# Patient Record
Sex: FEMALE | Race: WHITE | NOT HISPANIC OR LATINO | Employment: FULL TIME
[De-identification: names, ages, dates, MRNs, and addresses within clinical notes are randomized per-mention and may not be internally consistent; named-entity substitution may affect disease eponyms.]

---

## 2020-08-05 ENCOUNTER — TRANSCRIBE ORDERS (OUTPATIENT)
Dept: OTHER | Age: 35
End: 2020-08-05

## 2020-08-05 DIAGNOSIS — N64.59 ABNORMAL BREAST FINDING: Primary | ICD-10-CM

## 2020-08-10 ENCOUNTER — PATIENT OUTREACH (OUTPATIENT)
Dept: SURGERY | Facility: CLINIC | Age: 35
End: 2020-08-10

## 2020-08-10 NOTE — TELEPHONE ENCOUNTER
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      Referral updates and Plan:       Consult with Surgical Oncology     08/10/2020 11:48 AM - Called and left a message for patient regarding referral and requested a call back. Per review of chart looks like pt is scheduled for follow up and possible surgery at [facility]. Will discuss if patient wants consult at the [facility] and if so will arrange for consult with Dr. Maria Elena MARISCAL. Left my direct line for patient to call back.     08/11/2020 4:42 PM - Referral closed patient going to [facility].     Luz Decker, RN, BSN   Surgical Oncology New Patient Nurse Navigator  [facility]  [phone]

## 2023-04-19 ENCOUNTER — OFFICE VISIT (OUTPATIENT)
Dept: URGENT CARE | Facility: URGENT CARE | Age: 38
End: 2023-04-19
Payer: COMMERCIAL

## 2023-04-19 VITALS
HEART RATE: 77 BPM | TEMPERATURE: 98.8 F | SYSTOLIC BLOOD PRESSURE: 104 MMHG | RESPIRATION RATE: 16 BRPM | DIASTOLIC BLOOD PRESSURE: 69 MMHG | OXYGEN SATURATION: 100 %

## 2023-04-19 DIAGNOSIS — J98.01 ACUTE BRONCHOSPASM: Primary | ICD-10-CM

## 2023-04-19 PROCEDURE — 99203 OFFICE O/P NEW LOW 30 MIN: CPT | Performed by: FAMILY MEDICINE

## 2023-04-19 RX ORDER — CETIRIZINE HYDROCHLORIDE 10 MG/1
10 TABLET ORAL DAILY
COMMUNITY

## 2023-04-19 RX ORDER — PREDNISONE 20 MG/1
TABLET ORAL
Qty: 10 TABLET | Refills: 0 | Status: SHIPPED | OUTPATIENT
Start: 2023-04-19

## 2023-04-19 RX ORDER — POLYETHYLENE GLYCOL 3350 17 G/17G
17 POWDER, FOR SOLUTION ORAL DAILY
COMMUNITY

## 2023-04-19 RX ORDER — ALBUTEROL SULFATE 90 UG/1
2 AEROSOL, METERED RESPIRATORY (INHALATION) EVERY 6 HOURS PRN
Qty: 18 G | Refills: 0 | Status: SHIPPED | OUTPATIENT
Start: 2023-04-19

## 2023-04-19 NOTE — PROGRESS NOTES
"  Assessment & Plan     Acute bronchospasm    - albuterol (PROAIR HFA/PROVENTIL HFA/VENTOLIN HFA) 108 (90 Base) MCG/ACT inhaler; Inhale 2 puffs into the lungs every 6 hours as needed for shortness of breath, wheezing or cough  - predniSONE (DELTASONE) 20 MG tablet; 2 tabs once daily x 5 days    Recommend trialing albuterol inhaler first to see if this is enough to reduce bronchospasm in next 2-3 days.  If still not enough significant improvement- recommend starting prednisone burst.  Close Follow-up if no change or new or worsening sx prn.    Rhonda Jimenez MD  [facility] URG[facility] [place] is a 38 year old, presenting for the following health issues:  Urgent Care and Cough (Dry cough for 6 weeks-Getting worse in last week-worse in evenings \"coughing fits\"-Patient is 10 weeks pregnant)         View : No data to display.              HPI     Dry cough x 6 weeks- worsening in last week in PMs.  10 weeks GA. -- 2 year old son in .  He has hx of asthma after getting RSV at 4 weeks of age.  Patient has no hx of asthma.  Otherwise feels well.  Hx of seasonal allergies- using Zyrtec.  No fever or chills.  No SOB.    Review of Systems   Constitutional, HEENT, cardiovascular, pulmonary, GI, , musculoskeletal, neuro, skin, endocrine and psych systems are negative, except as otherwise noted.      Objective    /69   Pulse 77   Temp 98.8  F (37.1  C) (Tympanic)   Resp 16   SpO2 100%   Breastfeeding No   There is no height or weight on file to calculate BMI.  Physical Exam   GENERAL: healthy, alert and no distress  EYES: Eyes grossly normal to inspection, PERRL and conjunctivae and sclerae normal  NECK: no adenopathy, no asymmetry, masses, or scars and thyroid normal to palpation  RESP: lungs clear to auscultation - no rales, rhonchi or wheezes, dry prolonged expiration with cough.  No wheezes noted.  CV: regular rate and rhythm, normal S1 S2, no S3 or S4, no murmur, " click or rub, no peripheral edema and peripheral pulses strong  MS: no gross musculoskeletal defects noted, no edema  PSYCH: mentation appears normal, affect normal/bright

## 2023-12-17 ENCOUNTER — HEALTH MAINTENANCE LETTER (OUTPATIENT)
Age: 38
End: 2023-12-17

## 2024-09-13 ENCOUNTER — OFFICE VISIT (OUTPATIENT)
Dept: PODIATRY | Facility: CLINIC | Age: 39
End: 2024-09-13
Payer: COMMERCIAL

## 2024-09-13 VITALS
HEART RATE: 72 BPM | BODY MASS INDEX: 36.16 KG/M2 | SYSTOLIC BLOOD PRESSURE: 108 MMHG | HEIGHT: 66 IN | WEIGHT: 225 LBS | DIASTOLIC BLOOD PRESSURE: 72 MMHG

## 2024-09-13 DIAGNOSIS — M72.2 PLANTAR FASCIITIS, RIGHT: Primary | ICD-10-CM

## 2024-09-13 DIAGNOSIS — M76.821 POSTERIOR TIBIAL TENDON DYSFUNCTION (PTTD) OF BOTH LOWER EXTREMITIES: ICD-10-CM

## 2024-09-13 DIAGNOSIS — M76.822 POSTERIOR TIBIAL TENDON DYSFUNCTION (PTTD) OF BOTH LOWER EXTREMITIES: ICD-10-CM

## 2024-09-13 PROCEDURE — 99203 OFFICE O/P NEW LOW 30 MIN: CPT | Performed by: PODIATRIST

## 2024-09-13 ASSESSMENT — PAIN SCALES - GENERAL: PAINLEVEL: NO PAIN (1)

## 2024-09-13 NOTE — PATIENT INSTRUCTIONS

## 2024-09-13 NOTE — NURSING NOTE
"Chief Complaint   Patient presents with    Consult     Right foot pain       Initial /72   Pulse 72   Ht 1.676 m (5' 6\")   Wt 102.1 kg (225 lb)   Breastfeeding Yes   BMI 36.32 kg/m   Estimated body mass index is 36.32 kg/m  as calculated from the following:    Height as of this encounter: 1.676 m (5' 6\").    Weight as of this encounter: 102.1 kg (225 lb).  Medications and allergies reviewed.      Fatimah ROSE MA    "

## 2024-09-13 NOTE — LETTER
"9/13/2024      Cecilia Clark  [id number] [place] [address]      Dear Colleague,    Thank you for referring your patient, Cecilia Clark, to the [facility]. Please see a copy of my visit note below.    PATIENT HISTORY:  Cecilia Clark is a 39 year old female who presents to clinic with a chief complaint of right foot pain.  The patient is seen by themselves.  The patient relates the pain is primarily located around the outer edge into the arch.  Reports insidious onset without acute precipitating event.  The patient relates that the symptoms have been going on for several month(s).  The patient has previously tried different shoes with little relief.  The patient is currently employed as  .  Any previous notes and studies that pertain to the patient's condition were reviewed.    Pertinent medical, surgical and family history was reviewed in the [facility] chart.    Past Medical History: History reviewed. No pertinent past medical history.    Medications:   Current Outpatient Medications:      albuterol (PROAIR HFA/PROVENTIL HFA/VENTOLIN HFA) 108 (90 Base) MCG/ACT inhaler, Inhale 2 puffs into the lungs every 6 hours as needed for shortness of breath, wheezing or cough, Disp: 18 g, Rfl: 0     cetirizine (ZYRTEC) 10 MG tablet, Take 10 mg by mouth daily, Disp: , Rfl:      doxylamine (UNISOM) 25 MG TABS tablet, Take 25 mg by mouth At Bedtime, Disp: , Rfl:      polyethylene glycol (MIRALAX) 17 GM/Dose powder, Take 17 g by mouth daily, Disp: , Rfl:      predniSONE (DELTASONE) 20 MG tablet, 2 tabs once daily x 5 days, Disp: 10 tablet, Rfl: 0     Prenatal MV-Min-Fe Fum-FA-DHA (PRENATAL+DHA PO), , Disp: , Rfl:      Allergies:    Allergies   Allergen Reactions     Codeine      Other reaction(s): GI intolerance, GI Upset  Per [facility] external records.         Vitals: /72   Pulse 72   Ht 1.676 m (5' 6\")   Wt 102.1 kg (225 lb)   Breastfeeding Yes   BMI 36.32 " kg/m    BMI= Body mass index is 36.32 kg/m .    LOWER EXTREMITY PHYSICAL EXAM    Dermatologic: Skin is intact to right lower extremity without significant lesions, rash or abrasion.        Vascular: DP & PT pulses are intact & regular on the right.   CFT and skin temperature is normal to the right lower extremity.     Neurologic: Lower extremity sensation is intact to light touch.  No evidence of weakness in the right lower extremity.        Musculoskeletal: Patient is ambulatory without assistive device or brace.  No gross ankle deformity noted.  No foot or ankle joint effusion is noted.  Noted pain on palpation on the plantar aspect of the right heel near the insertion point of the plantar fascia.  No surrounding erythema or edema noted.  Noted collapse of the medial longitudinal arch with forefoot abduction bilaterally upon weight bearing exam.  Noted positive Owen's test bilaterally.  Noted tight gastroc complex bilaterally.              ASSESSMENT / PLAN:     ICD-10-CM    1. Plantar fasciitis, right  M72.2 Orthotics, Mastectomy and Custom Compression Orders      2. Posterior tibial tendon dysfunction (PTTD) of both lower extremities  M76.821 Orthotics, Mastectomy and Custom Compression Orders    M76.822           I have explained to Cecilia about the conditions.  We discussed the underlying contributing factors to the condition as well as both conservative and surgical treatment options along with expected length of recovery.  At this time, the patient was educated on the importance of offloading supportive shoes and other devices.  I demonstrated to the patient calf stretches to perform every hour daily until symptoms resolve.  After symptoms resolve, the patient was advised to perform the stretches 3 times daily to prevent future recurrence.  The patient was instructed to perform warm soaks with Epson salt after which to also apply over-the-counter Voltaren gel to deeply massage the injured tissue.  The  patient was instructed to do this on a daily basis until symptoms resolve.   The patient was prescribed custom orthotics that will aid in offloading the tension forces to the soft tissues and prevent further inflammation.  The patient may return in four weeks for reevaluation to determine if any further treatment will be needed.      Cecilia verbalized agreement with and understanding of the rational for the diagnosis and treatment plan.  All questions were answered to best of my ability and the patient's satisfaction. The patient was advised to contact the clinic with any questions that may arise after the clinic visit.      Disclaimer: This note consists of symbols derived from keyboarding, dictation and/or voice recognition software. As a result, there may be errors in the script that have gone undetected. Please consider this when interpreting information found in this chart.       MILTON Sheriff.P.NISHI., F.A.C.F.A.S.      Again, thank you for allowing me to participate in the care of your patient.        Sincerely,        Wero Mark DPM

## 2024-09-13 NOTE — PROGRESS NOTES
"PATIENT HISTORY:  Cecilia Clark is a 39 year old female who presents to clinic with a chief complaint of right foot pain.  The patient is seen by themselves.  The patient relates the pain is primarily located around the outer edge into the arch.  Reports insidious onset without acute precipitating event.  The patient relates that the symptoms have been going on for several month(s).  The patient has previously tried different shoes with little relief.  The patient is currently employed as  .  Any previous notes and studies that pertain to the patient's condition were reviewed.    Pertinent medical, surgical and family history was reviewed in the [facility] chart.    Past Medical History: History reviewed. No pertinent past medical history.    Medications:   Current Outpatient Medications:     albuterol (PROAIR HFA/PROVENTIL HFA/VENTOLIN HFA) 108 (90 Base) MCG/ACT inhaler, Inhale 2 puffs into the lungs every 6 hours as needed for shortness of breath, wheezing or cough, Disp: 18 g, Rfl: 0    cetirizine (ZYRTEC) 10 MG tablet, Take 10 mg by mouth daily, Disp: , Rfl:     doxylamine (UNISOM) 25 MG TABS tablet, Take 25 mg by mouth At Bedtime, Disp: , Rfl:     polyethylene glycol (MIRALAX) 17 GM/Dose powder, Take 17 g by mouth daily, Disp: , Rfl:     predniSONE (DELTASONE) 20 MG tablet, 2 tabs once daily x 5 days, Disp: 10 tablet, Rfl: 0    Prenatal MV-Min-Fe Fum-FA-DHA (PRENATAL+DHA PO), , Disp: , Rfl:      Allergies:    Allergies   Allergen Reactions    Codeine      Other reaction(s): GI intolerance, GI Upset  Per [facility] external records.         Vitals: /72   Pulse 72   Ht 1.676 m (5' 6\")   Wt 102.1 kg (225 lb)   Breastfeeding Yes   BMI 36.32 kg/m    BMI= Body mass index is 36.32 kg/m .    LOWER EXTREMITY PHYSICAL EXAM    Dermatologic: Skin is intact to right lower extremity without significant lesions, rash or abrasion.        Vascular: DP & PT pulses are intact & regular on the right.   CFT and " skin temperature is normal to the right lower extremity.     Neurologic: Lower extremity sensation is intact to light touch.  No evidence of weakness in the right lower extremity.        Musculoskeletal: Patient is ambulatory without assistive device or brace.  No gross ankle deformity noted.  No foot or ankle joint effusion is noted.  Noted pain on palpation on the plantar aspect of the right heel near the insertion point of the plantar fascia.  No surrounding erythema or edema noted.  Noted collapse of the medial longitudinal arch with forefoot abduction bilaterally upon weight bearing exam.  Noted positive Owen's test bilaterally.  Noted tight gastroc complex bilaterally.              ASSESSMENT / PLAN:     ICD-10-CM    1. Plantar fasciitis, right  M72.2 Orthotics, Mastectomy and Custom Compression Orders      2. Posterior tibial tendon dysfunction (PTTD) of both lower extremities  M76.821 Orthotics, Mastectomy and Custom Compression Orders    M76.822           I have explained to Cecilia about the conditions.  We discussed the underlying contributing factors to the condition as well as both conservative and surgical treatment options along with expected length of recovery.  At this time, the patient was educated on the importance of offloading supportive shoes and other devices.  I demonstrated to the patient calf stretches to perform every hour daily until symptoms resolve.  After symptoms resolve, the patient was advised to perform the stretches 3 times daily to prevent future recurrence.  The patient was instructed to perform warm soaks with Epson salt after which to also apply over-the-counter Voltaren gel to deeply massage the injured tissue.  The patient was instructed to do this on a daily basis until symptoms resolve.   The patient was prescribed custom orthotics that will aid in offloading the tension forces to the soft tissues and prevent further inflammation.  The patient may return in four weeks for  reevaluation to determine if any further treatment will be needed.      Cecilia verbalized agreement with and understanding of the rational for the diagnosis and treatment plan.  All questions were answered to best of my ability and the patient's satisfaction. The patient was advised to contact the clinic with any questions that may arise after the clinic visit.      Disclaimer: This note consists of symbols derived from keyboarding, dictation and/or voice recognition software. As a result, there may be errors in the script that have gone undetected. Please consider this when interpreting information found in this chart.       EDE Mark D.P.M., F.ERIK.C.F.A.S.

## 2025-01-12 ENCOUNTER — HEALTH MAINTENANCE LETTER (OUTPATIENT)
Age: 40
End: 2025-01-12